# Patient Record
Sex: FEMALE | Race: WHITE
[De-identification: names, ages, dates, MRNs, and addresses within clinical notes are randomized per-mention and may not be internally consistent; named-entity substitution may affect disease eponyms.]

---

## 2019-03-06 ENCOUNTER — HOSPITAL ENCOUNTER (EMERGENCY)
Dept: HOSPITAL 41 - JD.ED | Age: 69
Discharge: HOME | End: 2019-03-06
Payer: MEDICARE

## 2019-03-06 DIAGNOSIS — J21.9: Primary | ICD-10-CM

## 2019-03-06 DIAGNOSIS — Z79.82: ICD-10-CM

## 2019-03-06 DIAGNOSIS — Z87.891: ICD-10-CM

## 2019-03-06 DIAGNOSIS — Z79.899: ICD-10-CM

## 2019-03-06 PROCEDURE — 71045 X-RAY EXAM CHEST 1 VIEW: CPT

## 2019-03-06 PROCEDURE — 99283 EMERGENCY DEPT VISIT LOW MDM: CPT

## 2019-03-06 NOTE — EDM.PDOC
ED HPI GENERAL MEDICAL PROBLEM





- General


Chief Complaint: Respiratory Problem


Stated Complaint: SOB


Time Seen by Provider: 03/06/19 17:55


Source of Information: Reports: Patient


History Limitations: Reports: No Limitations





- History of Present Illness


INITIAL COMMENTS - FREE TEXT/NARRATIVE: 





68 y/o female presents to ER with cc dry cough and "low oxygen level."  She 

states she has had this cough for the past 6 weeks.  She states she was 

"treating herself with neb treatment and other medications."  She reports she 

was getting  better but the cough was bothersome.  She states she went to the 

clinic and while there her oxygen saturation was "91-92%."  She states she felt 

slightly SOB.  She feels she just "needs a RX of Prednisone."  Her oxygen level 

is 98% on room air.


Onset Date: 01/28/19


Duration: Week(s):, Intermittent


Location: Reports: Chest


Severity: Mild


Improves with: Reports: Medication


Worsens with: Reports: Movement


Associated Symptoms: Reports: Cough (dry non productive), Shortness of Breath


Other Treatments PTA: inhalers





- Related Data


 Allergies











Allergy/AdvReac Type Severity Reaction Status Date / Time


 


No Known Allergies Allergy   Verified 05/26/17 11:34











Home Meds: 


 Home Meds





Albuterol [Ventolin HFA] 2 puff INH ASDIRECTED 03/06/19 [History]


Aspirin [Adult Aspirin] 81 mg PO DAILY 03/06/19 [History]


Gabapentin [Neurontin] 300 mg PO BEDTIME 03/06/19 [History]


Levothyroxine [Synthroid] 100 mcg PO DAILY 03/06/19 [History]


Mometasone/Formoterol [Dulera 200-5 MCG] 2 puff INH BID 03/06/19 [History]


predniSONE [Prednisone] 20 mg PO DAILY 5 Days #10 tablet 03/06/19 [Rx]











Past Medical History


HEENT History: Reports: Other (See Below)


Other HEENT History: insomnia


Cardiovascular History: Reports: High Cholesterol


Endocrine/Metabolic History: Reports: Hypothyroidism


Oncologic (Cancer) History: Reports: Thyroid





- Past Surgical History


HEENT Surgical History: Reports: Adenoidectomy, Tonsillectomy


Other HEENT Surgeries/Procedures: torn retina right


GI Surgical History: Reports: Appendectomy


Female  Surgical History: Reports: Hysterectomy


Musculoskeletal Surgical History: Reports: Shoulder Surgery





Social & Family History





- Tobacco Use


Smoking Status *Q: Former Smoker


Used Tobacco, but Quit: Yes


Month/Year Tobacco Last Used: 45 yrs





- Caffeine Use


Caffeine Use: Reports: Coffee, Soda





- Recreational Drug Use


Recreational Drug Use: No





ED ROS GENERAL





- Review of Systems


Review Of Systems: See Below


Constitutional: Reports: No Symptoms


HEENT: Reports: No Symptoms


Respiratory: Reports: Shortness of Breath, Cough (dry cough)


Cardiovascular: Reports: No Symptoms


Endocrine: Reports: Other (history of thyroid cancer)


GI/Abdominal: Reports: No Symptoms


: Reports: No Symptoms


Musculoskeletal: Reports: No Symptoms


Skin: Reports: No Symptoms


Neurological: Reports: No Symptoms


Psychiatric: Reports: No Symptoms


Hematologic/Lymphatic: Reports: No Symptoms


Immunologic: Reports: No Symptoms





ED EXAM, GENERAL





- Physical Exam


Exam: See Below


Exam Limited By: No Limitations


General Appearance: Alert, WD/WN, No Apparent Distress


Ears: Normal External Exam, Normal Canal, Hearing Grossly Normal, Normal TMs


Nose: Normal Inspection, Normal Mucosa, No Blood


Throat/Mouth: Normal Inspection, Normal Lips, Normal Teeth, Normal Gums, Normal 

Oropharynx, Normal Voice, No Airway Compromise


Neck: Normal Inspection, Supple, Non-Tender, Full Range of Motion


Respiratory/Chest: No Respiratory Distress, Lungs Clear, Normal Breath Sounds (

98% on room air), No Accessory Muscle Use, Chest Non-Tender


Cardiovascular: Normal Peripheral Pulses, Regular Rate, Rhythm, No Edema, No 

Gallop, No JVD, No Murmur, No Rub, JVD


Neurological: Alert, Oriented, Normal Cognition, Normal Gait, Normal Reflexes, 

No Motor/Sensory Deficits


Skin Exam: Warm, Dry, Intact, Normal Color, No Rash


Lymphatic: No Adenopathy





Course





- Vital Signs


Last Recorded V/S: 


 Last Vital Signs











Temp  99.1 F   03/06/19 16:14


 


Pulse  78   03/06/19 16:14


 


Resp  20   03/06/19 16:14


 


BP  137/82   03/06/19 16:14


 


Pulse Ox  93 L  03/06/19 16:14














- Orders/Labs/Meds


Orders: 


 Active Orders 24 hr











 Category Date Time Status


 


 Chest 1V Frontal [CR] Stat Exams  03/06/19 17:39 Ordered











Meds: 


Medications














Discontinued Medications














Generic Name Dose Route Start Last Admin





  Trade Name Freq  PRN Reason Stop Dose Admin


 


Prednisone  20 mg  03/06/19 17:56  





  Prednisone  PO  03/06/19 17:57  





  ONETIME ONE   





     





     





     





     














- Re-Assessments/Exams


Free Text/Narrative Re-Assessment/Exam: 





03/06/19 18:10


Patient is ambulatory no apparent distress her oxygen level her saturation does 

not drop with activity.  It remains in upper 96-98%.  





Departure





- Departure


Time of Disposition: 18:19


Disposition: Home, Self-Care 01


Condition: Good


Clinical Impression: 


Acute bronchiolitis


Qualifiers:


 Bronchiolitis organism: unspecified organism Qualified Code(s): J21.9 - Acute 

bronchiolitis, unspecified








- Discharge Information


*PRESCRIPTION DRUG MONITORING PROGRAM REVIEWED*: Not Applicable


*COPY OF PRESCRIPTION DRUG MONITORING REPORT IN PATIENT SANDY: Not Applicable


Prescriptions: 


predniSONE [Prednisone] 20 mg PO DAILY 5 Days #10 tablet


Referrals: 


Bryan Manzano MD [Primary Care Provider] - 


Forms:  ED Department Discharge





- My Orders


Last 24 Hours: 


My Active Orders





03/06/19 17:39


Chest 1V Frontal [CR] Stat 














- Assessment/Plan


Last 24 Hours: 


My Active Orders





03/06/19 17:39


Chest 1V Frontal [CR] Stat

## 2019-03-07 NOTE — CR
Chest: Portable view of the chest was obtained.

 

Comparison: Prior chest x-ray of 07/01/09.

 

Previous bilateral shoulder surgery is noted.  Heart size and 

mediastinum are normal.  Lungs are clear.  No acute bony abnormality 

is seen.

 

Impression:

1.  Nothing acute is seen on portable chest x-ray.

 

Diagnostic code #2

## 2020-02-08 ENCOUNTER — HOSPITAL ENCOUNTER (EMERGENCY)
Dept: HOSPITAL 41 - JD.ED | Age: 70
Discharge: HOME | End: 2020-02-08
Payer: MEDICARE
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
## 2020-02-08 DIAGNOSIS — S61.412A: Primary | ICD-10-CM

## 2020-02-08 PROCEDURE — 12001 RPR S/N/AX/GEN/TRNK 2.5CM/<: CPT

## 2020-02-08 PROCEDURE — 99282 EMERGENCY DEPT VISIT SF MDM: CPT

## 2020-02-08 NOTE — EDM.PDOC
ED HPI GENERAL MEDICAL PROBLEM





- General


Chief Complaint: Laceration


Stated Complaint: LT HAND LAC


Time Seen by Provider: 02/08/20 14:56


Source of Information: Reports: Patient, RN Notes Reviewed





- History of Present Illness


INITIAL COMMENTS - FREE TEXT/NARRATIVE: 





7-year-old lady suffered laceration injury to left hand. Something "out of hand

" slipped and then suffered a puncture injury to her left hand, thenar base of 

thumb. There was a lot of bleeding initially. That is all stopped. No focal 

weakness, no numbness or tingling.


  ** Left Hand


Pain Score (Numeric/FACES): 3





- Related Data


 Allergies











Allergy/AdvReac Type Severity Reaction Status Date / Time


 


No Known Allergies Allergy   Verified 05/26/17 11:34











Home Meds: 


 Home Meds





Levothyroxine [Synthroid] 100 mcg PO DAILY 03/06/19 [History]











Past Medical History


HEENT History: Reports: Other (See Below)


Other HEENT History: insomnia


Cardiovascular History: Reports: High Cholesterol


Endocrine/Metabolic History: Reports: Hypothyroidism


Oncologic (Cancer) History: Reports: Thyroid





- Past Surgical History


HEENT Surgical History: Reports: Adenoidectomy, Tonsillectomy


Other HEENT Surgeries/Procedures: torn retina right


GI Surgical History: Reports: Appendectomy


Female  Surgical History: Reports: Hysterectomy


Musculoskeletal Surgical History: Reports: Shoulder Surgery





Social & Family History





- Tobacco Use


Smoking Status *Q: Never Smoker





- Caffeine Use


Caffeine Use: Reports: Coffee, Soda





- Recreational Drug Use


Recreational Drug Use: No





- Living Situation & Occupation


Living situation: Reports: 


Occupation: Retired





ED ROS GENERAL





- Review of Systems


Review Of Systems: See Below


HEENT: Reports: No Symptoms


Respiratory: Reports: No Symptoms


Musculoskeletal: Reports: Other


Neurological: Denies: Numbness (Left hand laceration), Tingling, Weakness





ED EXAM, NEURO





- Physical Exam


Exam: See Below


General Appearance: Alert, No Apparent Distress


Respiratory/Chest: No Respiratory Distress


Neurological: No Motor/Sensory Deficits


Extremities: Normal Range of Motion, Other (2 cm laceration thenar base of left 

thumb, moderately deep, gaping, minimal bleeding at time of exam)


Skin Exam: Warm, Dry





ED LACERATION PROCEDURES





- Laceration/Wound Repair


  ** Left Ventral Hand


Lac/wound length in cm: 2


Appearance: Linear, Clean


Local Anesthesia - Lidocaine (Xylocaine): 1% Plain


Skin Prep: Saline


Suture Size: 3-0


# of Sutures: 6


Suture Type: Nylon





Course





- Vital Signs


Last Recorded V/S: 


 Last Vital Signs











Temp  98.8 F   02/08/20 15:02


 


Pulse  70   02/08/20 15:02


 


Resp      


 


BP  118/74   02/08/20 15:02


 


Pulse Ox  96   02/08/20 15:02














- Orders/Labs/Meds


Meds: 


Medications














Discontinued Medications














Generic Name Dose Route Start Last Admin





  Trade Name Janet  PRN Reason Stop Dose Admin


 


Lidocaine HCl  10 ml  02/08/20 15:14  02/08/20 15:21





  Xylocaine 1%  INJECT  02/08/20 15:15  10 ml





  ONETIME ONE   Administration





     





     





     





     














Departure





- Departure


Time of Disposition: 15:42


Disposition: Home, Self-Care 01


Clinical Impression: 


Hand laceration


Qualifiers:


 Encounter type: initial encounter Foreign body presence: without foreign body 

Laterality: left Qualified Code(s): S61.412A - Laceration without foreign body 

of left hand, initial encounter








- Discharge Information


Referrals: 


Dawna Streeter MD [Primary Care Provider] - 


Forms:  ED Department Discharge


Additional Instructions: 


Laceration care instructions, ice packs and elevation as needed for swelling, 

stitches out in about 10 days, have rechecked any sign of infection.





Sepsis Event Note





- Evaluation


Sepsis Screening Result: No Definite Risk





- Focused Exam


Vital Signs: 


 Vital Signs











  Temp Pulse BP Pulse Ox


 


 02/08/20 15:02  98.8 F  70  118/74  96











Date Exam was Performed: 02/08/20


Time Exam was Performed: 15:44